# Patient Record
Sex: FEMALE | Race: WHITE | Employment: OTHER | URBAN - METROPOLITAN AREA
[De-identification: names, ages, dates, MRNs, and addresses within clinical notes are randomized per-mention and may not be internally consistent; named-entity substitution may affect disease eponyms.]

---

## 2017-01-09 ENCOUNTER — TELEPHONE (OUTPATIENT)
Dept: INTERNAL MEDICINE CLINIC | Age: 57
End: 2017-01-09

## 2017-02-01 ENCOUNTER — OFFICE VISIT (OUTPATIENT)
Dept: INTERNAL MEDICINE CLINIC | Age: 57
End: 2017-02-01

## 2017-02-01 VITALS
SYSTOLIC BLOOD PRESSURE: 122 MMHG | HEIGHT: 65 IN | WEIGHT: 158 LBS | BODY MASS INDEX: 26.33 KG/M2 | DIASTOLIC BLOOD PRESSURE: 68 MMHG | OXYGEN SATURATION: 99 % | TEMPERATURE: 98.1 F | HEART RATE: 58 BPM

## 2017-02-01 DIAGNOSIS — R31.9 HEMATURIA: Primary | ICD-10-CM

## 2017-02-01 DIAGNOSIS — Z01.818 PRE-OP EXAMINATION: ICD-10-CM

## 2017-02-01 PROCEDURE — 99213 OFFICE O/P EST LOW 20 MIN: CPT | Performed by: NURSE PRACTITIONER

## 2017-02-10 ENCOUNTER — HOSPITAL ENCOUNTER (OUTPATIENT)
Dept: CT IMAGING | Age: 57
Discharge: HOME | End: 2017-02-10
Attending: UROLOGY | Admitting: UROLOGY

## 2017-02-10 DIAGNOSIS — R31.21 ASYMPTOMATIC MICROSCOPIC HEMATURIA: ICD-10-CM

## 2017-10-06 ENCOUNTER — HOSPITAL ENCOUNTER (OUTPATIENT)
Dept: MAMMOGRAPHY | Age: 57
Discharge: HOME | End: 2017-10-06
Attending: OBSTETRICS & GYNECOLOGY | Admitting: OBSTETRICS & GYNECOLOGY

## 2017-10-06 DIAGNOSIS — Z12.31 ENCOUNTER FOR SCREENING MAMMOGRAM FOR MALIGNANT NEOPLASM OF BREAST: ICD-10-CM

## 2017-12-29 ENCOUNTER — OFFICE VISIT (OUTPATIENT)
Dept: INTERNAL MEDICINE CLINIC | Age: 57
End: 2017-12-29

## 2017-12-29 VITALS
DIASTOLIC BLOOD PRESSURE: 70 MMHG | WEIGHT: 161 LBS | OXYGEN SATURATION: 98 % | HEIGHT: 64 IN | HEART RATE: 82 BPM | BODY MASS INDEX: 27.49 KG/M2 | SYSTOLIC BLOOD PRESSURE: 123 MMHG

## 2017-12-29 DIAGNOSIS — R31.9 HEMATURIA, UNSPECIFIED TYPE: ICD-10-CM

## 2017-12-29 DIAGNOSIS — Z00.00 ANNUAL PHYSICAL EXAM: Primary | ICD-10-CM

## 2017-12-29 LAB
BILIRUBIN, POC: NORMAL
BLOOD URINE, POC: NORMAL
CLARITY, POC: CLEAR
COLOR, POC: YELLOW
GLUCOSE URINE, POC: NORMAL
KETONES, POC: NORMAL
LEUKOCYTE EST, POC: NORMAL
NITRITE, POC: NORMAL
PH, POC: 5
PROTEIN, POC: NORMAL
SPECIFIC GRAVITY, POC: 1.02
UROBILINOGEN, POC: 0.2

## 2017-12-29 PROCEDURE — 99396 PREV VISIT EST AGE 40-64: CPT | Performed by: INTERNAL MEDICINE

## 2017-12-29 PROCEDURE — 81002 URINALYSIS NONAUTO W/O SCOPE: CPT | Performed by: INTERNAL MEDICINE

## 2017-12-29 NOTE — PROGRESS NOTES
 Onychomadesis     Right lumbar radiculopathy 4/2009    Toenail fungus      Past Surgical History:   Procedure Laterality Date    COLONOSCOPY  03/8/13    polyp Redo 5 yrs     Family History   Problem Relation Age of Onset    Diabetes Mother     Arthritis Mother     Cancer Mother      skin    Arrhythmia Father     Arthritis Father      Social History     Social History    Marital status:      Spouse name: N/A    Number of children: N/A    Years of education: N/A     Occupational History    Not on file. Social History Main Topics    Smoking status: Never Smoker    Smokeless tobacco: Never Used    Alcohol use Yes      Comment: 1 wine per week    Drug use: No    Sexual activity: Yes     Partners: Male     Other Topics Concern    Not on file     Social History Narrative    No narrative on file       Review of Systems:  Constitutional: negative  HENT: negative  EYES: negative  Respiratory: negative  Cardiovasular :Negative  Gastrointestinal: negative  Endocrine: negative  Musculoskeletal:  Lt Foot  Skin: negative  Allergic/Immunological: negative  Hematological: negative  Psychiatric/Behavorial: negative  : negative  Renal: negative  CNS: negative    Physical Exam: Blood pressure 123/70, pulse 82, height 5' 4.25\" (1.632 m), weight 161 lb (73 kg), SpO2 98 %, not currently breastfeeding. Vitals:    12/29/17 1010   Pulse: 82   SpO2: 98%   Weight: 161 lb (73 kg)   Height: 5' 4.25\" (1.632 m)     Body mass index is 27.42 kg/m². Constitutional: She is oriented to person, place, and time. She appears well-developed and well-nourished. No distress. HEENT:   Head: Normocephalic and atraumatic. Right Ear: Tympanic membrane, external ear and ear canal normal.   Left Ear: Tympanic membrane, external ear and ear canal normal.   Nose: Nose normal.   Mouth/Throat: Oropharynx is clear and moist, and mucous membranes are normal.  There is no cervical adenopathy.   Eyes: Conjunctivae and extraocular motions are normal. Pupils are equal, round, and reactive to light. Neck: Neck supple. No JVD present. Carotid bruit is not present. No mass and no thyromegaly present. Cardiovascular: Normal rate, regular rhythm, normal heart sounds and intact distal pulses. Exam reveals no gallop and no friction rub. No murmur heard. Pulmonary/Chest: Effort normal and breath sounds normal. No respiratory distress. She has no wheezes, rhonchi or rales. Abdominal: Soft, non-tender. Bowel sounds and aorta are normal. She exhibits no organomegaly, mass or bruit. Genitourinary:  Not examined. Breast exam:  Not examined. Musculoskeletal: Normal range of motion, no synovitis. She exhibits no edema. left lower extremity in a boot  Neurological: She is alert and oriented to person, place, and time. She has normal reflexes. No cranial nerve deficit. Coordination normal.   Skin: Skin is warm and dry. There is no rash or erythema. No suspicious lesions noted. Psychiatric: She has a normal mood and affect. Her speech is normal and behavior is normal. Judgment, cognition and memory are normal.                                Testing: Urinalysis: Unremarkable. Results for orders placed or performed in visit on 12/29/17   POCT Urinalysis no Micro   Result Value Ref Range    Color, UA yellow     Clarity, UA clear     Glucose, UA POC neg     Bilirubin, UA neg     Ketones, UA neg     Spec Grav, UA 1.025     Blood, UA POC neg     pH, UA 5.0     Protein, UA POC neg     Urobilinogen, UA 0.2     Leukocytes, UA neg     Nitrite, UA neg          Assessment/Plan:    Patient Active Problem List   Diagnosis    Hypoglycemia    Toenail fungus    Onychomadesis    Right lumbar radiculopathy    Hematuria: Previous workup unremarkable. [facility]: Slip given for fasting laboratory studies and call for results  Return follow-up  Dr. Heber Schaumann

## 2018-01-05 ENCOUNTER — TELEPHONE (OUTPATIENT)
Dept: INTERNAL MEDICINE CLINIC | Age: 58
End: 2018-01-05

## 2018-01-05 NOTE — TELEPHONE ENCOUNTER
Patient calling to check on results of blood work done at [facility] on Wednesday. Call [phone] with any results. Also, patient wants to know if Dr. Hung Reynaga received any information about her Pathology Report from Colonoscopy 5 years ago?     Please advise -

## 2018-09-30 ENCOUNTER — HOSPITAL ENCOUNTER (EMERGENCY)
Age: 58
Discharge: HOME | End: 2018-09-30
Payer: COMMERCIAL

## 2018-09-30 VITALS
DIASTOLIC BLOOD PRESSURE: 85 MMHG | SYSTOLIC BLOOD PRESSURE: 131 MMHG | OXYGEN SATURATION: 100 % | TEMPERATURE: 98 F | RESPIRATION RATE: 16 BRPM | WEIGHT: 160 LBS | HEIGHT: 65 IN | BODY MASS INDEX: 26.66 KG/M2 | HEART RATE: 52 BPM

## 2018-09-30 DIAGNOSIS — S61.411A LACERATION OF RIGHT HAND WITHOUT FOREIGN BODY, INITIAL ENCOUNTER: Primary | ICD-10-CM

## 2018-09-30 PROCEDURE — 4500000022 HC ED LEVEL 2 PROCEDURE

## 2018-09-30 PROCEDURE — 99282 EMERGENCY DEPT VISIT SF MDM: CPT

## 2018-09-30 ASSESSMENT — PAIN DESCRIPTION - ORIENTATION: ORIENTATION: RIGHT

## 2018-09-30 ASSESSMENT — PAIN SCALES - GENERAL: PAINLEVEL_OUTOF10: 3

## 2018-09-30 ASSESSMENT — PAIN DESCRIPTION - LOCATION: LOCATION: HAND

## 2018-10-09 ENCOUNTER — OFFICE VISIT (OUTPATIENT)
Dept: INTERNAL MEDICINE CLINIC | Age: 58
End: 2018-10-09
Payer: COMMERCIAL

## 2018-10-09 VITALS
HEIGHT: 65 IN | BODY MASS INDEX: 27.16 KG/M2 | WEIGHT: 163 LBS | DIASTOLIC BLOOD PRESSURE: 76 MMHG | SYSTOLIC BLOOD PRESSURE: 110 MMHG

## 2018-10-09 DIAGNOSIS — S69.91XD INJURY OF RIGHT HAND, SUBSEQUENT ENCOUNTER: Primary | ICD-10-CM

## 2018-10-09 DIAGNOSIS — Z23 IMMUNIZATION DUE: ICD-10-CM

## 2018-10-09 PROCEDURE — 3017F COLORECTAL CA SCREEN DOC REV: CPT | Performed by: NURSE PRACTITIONER

## 2018-10-09 PROCEDURE — 99212 OFFICE O/P EST SF 10 MIN: CPT | Performed by: NURSE PRACTITIONER

## 2018-10-09 PROCEDURE — G8427 DOCREV CUR MEDS BY ELIG CLIN: HCPCS | Performed by: NURSE PRACTITIONER

## 2018-10-09 PROCEDURE — G8482 FLU IMMUNIZE ORDER/ADMIN: HCPCS | Performed by: NURSE PRACTITIONER

## 2018-10-09 PROCEDURE — 1036F TOBACCO NON-USER: CPT | Performed by: NURSE PRACTITIONER

## 2018-10-09 PROCEDURE — G8419 CALC BMI OUT NRM PARAM NOF/U: HCPCS | Performed by: NURSE PRACTITIONER

## 2018-10-09 PROCEDURE — 90471 IMMUNIZATION ADMIN: CPT | Performed by: NURSE PRACTITIONER

## 2018-10-09 PROCEDURE — 90686 IIV4 VACC NO PRSV 0.5 ML IM: CPT | Performed by: NURSE PRACTITIONER

## 2018-10-09 ASSESSMENT — ENCOUNTER SYMPTOMS
COUGH: 0
SHORTNESS OF BREATH: 0
BLOOD IN STOOL: 0
COLOR CHANGE: 0
ABDOMINAL PAIN: 0
ROS SKIN COMMENTS: SEE HPI
CONSTIPATION: 0

## 2018-10-09 ASSESSMENT — PATIENT HEALTH QUESTIONNAIRE - PHQ9
SUM OF ALL RESPONSES TO PHQ9 QUESTIONS 1 & 2: 0
SUM OF ALL RESPONSES TO PHQ QUESTIONS 1-9: 0
1. LITTLE INTEREST OR PLEASURE IN DOING THINGS: 0
2. FEELING DOWN, DEPRESSED OR HOPELESS: 0
SUM OF ALL RESPONSES TO PHQ QUESTIONS 1-9: 0

## 2018-10-22 ENCOUNTER — OFFICE VISIT (OUTPATIENT)
Dept: INTERNAL MEDICINE CLINIC | Age: 58
End: 2018-10-22
Payer: COMMERCIAL

## 2018-10-22 VITALS
DIASTOLIC BLOOD PRESSURE: 68 MMHG | WEIGHT: 165 LBS | OXYGEN SATURATION: 100 % | SYSTOLIC BLOOD PRESSURE: 118 MMHG | HEART RATE: 53 BPM | BODY MASS INDEX: 27.46 KG/M2

## 2018-10-22 DIAGNOSIS — M54.16 RIGHT LUMBAR RADICULOPATHY: Primary | ICD-10-CM

## 2018-10-22 PROCEDURE — G8419 CALC BMI OUT NRM PARAM NOF/U: HCPCS | Performed by: INTERNAL MEDICINE

## 2018-10-22 PROCEDURE — G8427 DOCREV CUR MEDS BY ELIG CLIN: HCPCS | Performed by: INTERNAL MEDICINE

## 2018-10-22 PROCEDURE — 99214 OFFICE O/P EST MOD 30 MIN: CPT | Performed by: INTERNAL MEDICINE

## 2018-10-22 PROCEDURE — G8482 FLU IMMUNIZE ORDER/ADMIN: HCPCS | Performed by: INTERNAL MEDICINE

## 2018-10-22 PROCEDURE — 1036F TOBACCO NON-USER: CPT | Performed by: INTERNAL MEDICINE

## 2018-10-22 PROCEDURE — 3017F COLORECTAL CA SCREEN DOC REV: CPT | Performed by: INTERNAL MEDICINE

## 2018-10-22 RX ORDER — DICLOFENAC SODIUM 75 MG/1
75 TABLET, DELAYED RELEASE ORAL 2 TIMES DAILY
Qty: 60 TABLET | Refills: 0 | Status: SHIPPED | OUTPATIENT
Start: 2018-10-22

## 2018-10-22 RX ORDER — PREDNISONE 10 MG/1
TABLET ORAL
Qty: 40 TABLET | Refills: 0 | Status: SHIPPED | OUTPATIENT
Start: 2018-10-22

## 2018-10-22 RX ORDER — TRAMADOL HYDROCHLORIDE 50 MG/1
50 TABLET ORAL EVERY 8 HOURS PRN
Qty: 20 TABLET | Refills: 0 | Status: SHIPPED | OUTPATIENT
Start: 2018-10-22 | End: 2018-11-21

## 2018-10-22 RX ORDER — GABAPENTIN 100 MG/1
CAPSULE ORAL
Qty: 90 CAPSULE | Refills: 0 | Status: SHIPPED | OUTPATIENT
Start: 2018-10-22 | End: 2018-11-18 | Stop reason: SDUPTHER

## 2018-10-22 NOTE — PROGRESS NOTES
Subjective:      Patient ID: Be Catherine is a 66 y.o. female. CC: Back pain   HPI: Patient relates last week on Monday the onset of \"stiffness\" when getting up from sitting. By Tuesday right low back pain became worse and this was followed by tingling and aching down her right leg to about her foot. Patient denies any trauma. No bladder or stool dysfunction. Patient relates previous lumbar radiculopathy she believes on the left side. Medicines and allergies reviewed  Health maintenance reviewed  Family history reviewed. Social history reviewed  Reviewed laboratory data 1/3/2018    Review of Systems      Review of Systems   Constitutional: negative   HENT: negative   EYES: negative   Respiratory: negative   Gastrointestinal: negative   Endocrine: negative   Musculoskeletal: Acute lumbar radiculopathy. Skin: negative   Allergic/Immunological: negative   Hematological: negative   Psychiatric/Behavorial: negative   CV: negative   CNS: negative   :Negative   S/E:Negative  Renal: Negative      Objective:   Physical Exam : Lungs: Clear to auscultation. No wheezing. CV: S1-S2 normal.  No murmur. Carotid: Good upstroke no bruit. Head/neck: Neck: No lymphadenopathy. Thyroid: Not palpable not tender to touch. Spine/extremities: No ankle edema. Skin: No rash. CNS: Right lower lumbar tenderness to palpation. Marked decrease in range of motion with increase in discomfort with minimal flexion. Negative straight leg raising test bilaterally. Patient able to point toes against resistance. Light touch about lower extremities normal.Patient's alert, cooperative, moves all 4 limbs, ambulates with some difficulty, no slurred speech, no facial droop, good orientation. Good historian. Blood pressure 118/68, pulse 53, weight 165 lb (74.8 kg), SpO2 100 %, not currently breastfeeding. Assessment:      1. Acute lumbar radiculopathy right-sided.   2.  Previous history of lumbar radiculopathy         Plan: 1.  Start prednisone on tapering dose take with food warned about possible GI upset. When he finished then start  2. Voltaren 75 mg b.i.d. with meals warned about possible GI upset  3. Neurontin 100 mg start with 1 work up to 3 before bedtime for nerve pain  4. Ultram 50 mg take 1 p.o. t.i.d. p.r.n. for pain #20 no refill  5.   Try over-the-counter Pepcid for stomach upset  Return follow-up  Call for acute change in condition  Dr. Chepe Choi MD

## 2019-03-12 ENCOUNTER — HOSPITAL ENCOUNTER (OUTPATIENT)
Dept: WOMENS IMAGING | Age: 59
Discharge: HOME | End: 2019-03-12
Payer: COMMERCIAL

## 2019-03-12 DIAGNOSIS — Z12.31 VISIT FOR SCREENING MAMMOGRAM: ICD-10-CM

## 2019-03-12 PROCEDURE — 77067 SCR MAMMO BI INCL CAD: CPT

## 2019-03-12 PROCEDURE — 77063 BREAST TOMOSYNTHESIS BI: CPT

## 2020-07-27 ENCOUNTER — HOSPITAL ENCOUNTER (OUTPATIENT)
Dept: WOMENS IMAGING | Age: 60
Discharge: HOME | End: 2020-07-27
Payer: COMMERCIAL

## 2020-07-27 PROCEDURE — 77067 SCR MAMMO BI INCL CAD: CPT

## 2022-07-06 ENCOUNTER — HOSPITAL ENCOUNTER (OUTPATIENT)
Dept: WOMENS IMAGING | Age: 62
Discharge: HOME | End: 2022-07-06
Payer: COMMERCIAL

## 2022-07-06 DIAGNOSIS — Z12.31 VISIT FOR SCREENING MAMMOGRAM: ICD-10-CM

## 2022-07-06 PROCEDURE — 77063 BREAST TOMOSYNTHESIS BI: CPT

## 2023-08-23 ENCOUNTER — HOSPITAL ENCOUNTER (OUTPATIENT)
Dept: WOMENS IMAGING | Age: 63
Discharge: HOME | End: 2023-08-23
Payer: COMMERCIAL

## 2023-08-23 VITALS — HEIGHT: 65 IN | BODY MASS INDEX: 24.16 KG/M2 | WEIGHT: 145 LBS

## 2023-08-23 DIAGNOSIS — Z12.31 ENCOUNTER FOR SCREENING MAMMOGRAM FOR BREAST CANCER: ICD-10-CM

## 2023-08-23 PROCEDURE — 77063 BREAST TOMOSYNTHESIS BI: CPT

## 2024-08-26 ENCOUNTER — HOSPITAL ENCOUNTER (OUTPATIENT)
Dept: WOMENS IMAGING | Age: 64
Discharge: HOME | End: 2024-08-26
Payer: COMMERCIAL

## 2024-08-26 VITALS — BODY MASS INDEX: 24.59 KG/M2 | HEIGHT: 64 IN | WEIGHT: 144 LBS

## 2024-08-26 DIAGNOSIS — Z12.31 SCREENING MAMMOGRAM, ENCOUNTER FOR: ICD-10-CM

## 2024-08-26 PROCEDURE — 77063 BREAST TOMOSYNTHESIS BI: CPT
